# Patient Record
Sex: FEMALE | URBAN - METROPOLITAN AREA
[De-identification: names, ages, dates, MRNs, and addresses within clinical notes are randomized per-mention and may not be internally consistent; named-entity substitution may affect disease eponyms.]

---

## 2020-03-05 ENCOUNTER — APPOINTMENT (OUTPATIENT)
Age: 35
Setting detail: DERMATOLOGY
End: 2020-03-05

## 2020-03-05 VITALS — RESPIRATION RATE: 15 BRPM | HEIGHT: 62 IN | WEIGHT: 155 LBS

## 2020-03-05 DIAGNOSIS — L21.8 OTHER SEBORRHEIC DERMATITIS: ICD-10-CM

## 2020-03-05 PROCEDURE — OTHER REASSURANCE: OTHER

## 2020-03-05 PROCEDURE — 99202 OFFICE O/P NEW SF 15 MIN: CPT

## 2020-03-05 PROCEDURE — OTHER ADDITIONAL NOTES: OTHER

## 2020-03-05 PROCEDURE — OTHER COUNSELING: OTHER

## 2020-03-05 NOTE — PROCEDURE: ADDITIONAL NOTES
Detail Level: Detailed
Additional Notes: Patient has OTC hydrocortisone 1% cream, apply several times a day till clear then as needed.\\n\\nIf symptoms do not resolve, [facility] would consider sending in a anti fungal cream.

## 2020-03-13 ENCOUNTER — RX ONLY (RX ONLY)
Age: 35
End: 2020-03-13

## 2020-03-13 RX ORDER — KETOCONAZOLE 20 MG/G
2% CREAM TOPICAL BID
Qty: 1 | Refills: 1 | Status: ERX | COMMUNITY
Start: 2020-03-13

## 2020-03-16 ENCOUNTER — RX ONLY (RX ONLY)
Age: 35
End: 2020-03-16

## 2020-03-16 RX ORDER — KETOCONAZOLE 20 MG/G
A THIN LAYER CREAM TOPICAL BID
Qty: 1 | Refills: 0 | Status: ERX